# Patient Record
Sex: MALE | Race: OTHER | NOT HISPANIC OR LATINO | RURAL
[De-identification: names, ages, dates, MRNs, and addresses within clinical notes are randomized per-mention and may not be internally consistent; named-entity substitution may affect disease eponyms.]

---

## 2022-08-15 ENCOUNTER — OFFICE VISIT (OUTPATIENT)
Dept: FAMILY MEDICINE | Facility: CLINIC | Age: 27
End: 2022-08-15
Payer: COMMERCIAL

## 2022-08-15 VITALS
DIASTOLIC BLOOD PRESSURE: 82 MMHG | BODY MASS INDEX: 27.77 KG/M2 | SYSTOLIC BLOOD PRESSURE: 112 MMHG | RESPIRATION RATE: 18 BRPM | OXYGEN SATURATION: 98 % | HEART RATE: 74 BPM | TEMPERATURE: 98 F | WEIGHT: 205 LBS | HEIGHT: 72 IN

## 2022-08-15 DIAGNOSIS — L30.9 ECZEMA, UNSPECIFIED TYPE: Primary | ICD-10-CM

## 2022-08-15 PROCEDURE — 99203 PR OFFICE/OUTPT VISIT, NEW, LEVL III, 30-44 MIN: ICD-10-PCS | Mod: ,,, | Performed by: NURSE PRACTITIONER

## 2022-08-15 PROCEDURE — 3074F SYST BP LT 130 MM HG: CPT | Mod: ,,, | Performed by: NURSE PRACTITIONER

## 2022-08-15 PROCEDURE — 3008F BODY MASS INDEX DOCD: CPT | Mod: ,,, | Performed by: NURSE PRACTITIONER

## 2022-08-15 PROCEDURE — 3079F DIAST BP 80-89 MM HG: CPT | Mod: ,,, | Performed by: NURSE PRACTITIONER

## 2022-08-15 PROCEDURE — 99203 OFFICE O/P NEW LOW 30 MIN: CPT | Mod: ,,, | Performed by: NURSE PRACTITIONER

## 2022-08-15 PROCEDURE — 3079F PR MOST RECENT DIASTOLIC BLOOD PRESSURE 80-89 MM HG: ICD-10-PCS | Mod: ,,, | Performed by: NURSE PRACTITIONER

## 2022-08-15 PROCEDURE — 1159F MED LIST DOCD IN RCRD: CPT | Mod: ,,, | Performed by: NURSE PRACTITIONER

## 2022-08-15 PROCEDURE — 1159F PR MEDICATION LIST DOCUMENTED IN MEDICAL RECORD: ICD-10-PCS | Mod: ,,, | Performed by: NURSE PRACTITIONER

## 2022-08-15 PROCEDURE — 3074F PR MOST RECENT SYSTOLIC BLOOD PRESSURE < 130 MM HG: ICD-10-PCS | Mod: ,,, | Performed by: NURSE PRACTITIONER

## 2022-08-15 PROCEDURE — 3008F PR BODY MASS INDEX (BMI) DOCUMENTED: ICD-10-PCS | Mod: ,,, | Performed by: NURSE PRACTITIONER

## 2022-08-15 RX ORDER — TRIAMCINOLONE ACETONIDE 1 MG/G
CREAM TOPICAL 2 TIMES DAILY
Qty: 60 G | Refills: 0 | Status: SHIPPED | OUTPATIENT
Start: 2022-08-15

## 2022-08-15 NOTE — PROGRESS NOTES
TONG Jackson   [facility]  [address]   [place] [address]     PATIENT NAME: Nazario Chavez  : 1995  DATE: 8/15/22  MRN: [id number]      Billing Provider: TONG Jackson  Level of Service:   Patient PCP Information     Provider PCP Type    TONG Jackson [facility]          Reason for Visit / Chief Complaint: Rash (Has had a rash on both feet x one year. Scaly and doesn't itch. He has assumed that his boots has caused it from rubbing then daily. Pt works for [employer])       Update PCP  Update Chief Complaint         History of Present Illness / Problem Focused Workflow     Nazario Chavez is a 27 y.o. male presents to the clinic  With a rash on his feet/legs x one year and bothers his wife and has a spot on his side..  No itching and no discomfort and is always the same.  He used some lotrimin lotion on the areas twice daily for a week and  Did not improve.  He is in good health otherwise.  He works out aggressively at work twice daily and runs 1 1/2 miles daily--he works for [employer]      Review of Systems     Review of Systems   Constitutional: Negative for fatigue.   HENT: Negative for nasal congestion and sore throat.    Respiratory: Negative for cough, chest tightness and shortness of breath.    Cardiovascular: Negative for chest pain, palpitations and leg swelling.   Gastrointestinal: Negative for nausea, vomiting and reflux.   Integumentary:  Positive for rash.   Neurological: Negative for weakness and memory loss.   Psychiatric/Behavioral: Negative for confusion and sleep disturbance.        Medical / Social / Family History   History reviewed. No pertinent past medical history.    Past Surgical History:   Procedure Laterality Date    APPENDECTOMY      lypoma      scalp       Social History    reports that he has never smoked. His smokeless tobacco use includes snuff. He reports that he does not drink alcohol and does not use drugs.    Family History  .'s family  history is not on file.    Medications and Allergies     Medications  No outpatient medications have been marked as taking for the 8/15/22 encounter (Office Visit) with TONG Jackson.       Allergies  Review of patient's allergies indicates:   Allergen Reactions    Advil [ibuprofen]        Physical Examination     Vitals:    08/15/22 1257   BP: 112/82   BP Location: Right arm   Patient Position: Sitting   BP Method: Large (Manual)   Pulse: 74   Resp: 18   Temp: 98.1 °F (36.7 °C)   TempSrc: Oral   SpO2: 98%   Weight: 93 kg (205 lb)   Height: 6' (1.829 m)      Physical Exam  Constitutional:       Appearance: Normal appearance.   Cardiovascular:      Rate and Rhythm: Normal rate and regular rhythm.      Pulses: Normal pulses.      Heart sounds: Normal heart sounds.   Pulmonary:      Effort: Pulmonary effort is normal.      Breath sounds: Normal breath sounds.   Skin:     General: Skin is warm and dry.      Comments: Bilateral feet with scaly, dry areas at medial malleosus area and distal  Dorsal surface of foot. No rashes between toes. He does have some eczematous areas on soles of feet such as with dishydrosis.    Left palm/fingers with dishydrotic type rash.    Abdomen with eczematous rash entire perimumbilical  Area and just to the left.   Neurological:      Mental Status: He is alert and oriented to person, place, and time.          Assessment and Plan (including Health Maintenance)      Problem List  Smart Sets  Document Outside HM   :    Plan:    discussed need to keep areas dry, change socks frequently.  Purchase Cera Ve cream and mix in equal parts with Triamcinolone 0.1% cream and apply twice daily. Follow up if worsens,.    Encourage covid vaccine.      Health Maintenance Due   Topic Date Due    Hepatitis C Screening  Never done    Lipid Panel  Never done    COVID-19 Vaccine (1) Never done    HIV Screening  Never done    TETANUS VACCINE  Never done       Problem List Items Addressed This Visit     None       There are no diagnoses linked to this encounter.   Health Maintenance Topics with due status: Not Due       Topic Last Completion Date    Influenza Vaccine Not Due       Procedures     No future appointments.     No follow-ups on file.     Signature:  TONG Jackson    Date of encounter: 8/15/22